# Patient Record
Sex: FEMALE
[De-identification: names, ages, dates, MRNs, and addresses within clinical notes are randomized per-mention and may not be internally consistent; named-entity substitution may affect disease eponyms.]

---

## 2018-05-05 ENCOUNTER — HOSPITAL ENCOUNTER (EMERGENCY)
Dept: HOSPITAL 92 - ERS | Age: 83
Discharge: HOME | End: 2018-05-05
Payer: MEDICARE

## 2018-05-05 DIAGNOSIS — Z79.899: ICD-10-CM

## 2018-05-05 DIAGNOSIS — F41.9: ICD-10-CM

## 2018-05-05 DIAGNOSIS — E03.9: ICD-10-CM

## 2018-05-05 DIAGNOSIS — N39.0: Primary | ICD-10-CM

## 2018-05-05 LAB
BACTERIA UR QL AUTO: (no result) HPF
CRYSTAL-AUWI FLAG: 0 (ref 0–15)
HEV IGM SER QL: 0.3 (ref 0–7.99)
HYALINE CASTS #/AREA URNS LPF: (no result) LPF
PATHC CAST-AUWI FLAG: 0.34 (ref 0–2.49)
PROT UR STRIP.AUTO-MCNC: 100 MG/DL
SP GR UR STRIP: 1.01 (ref 1–1.04)
SPERM-AUWI FLAG: 750 (ref 0–9.9)
YEAST-AUWI FLAG: 1282.4 (ref 0–25)

## 2018-05-05 PROCEDURE — 81015 MICROSCOPIC EXAM OF URINE: CPT

## 2018-05-05 PROCEDURE — 87077 CULTURE AEROBIC IDENTIFY: CPT

## 2018-05-05 PROCEDURE — 87086 URINE CULTURE/COLONY COUNT: CPT

## 2018-05-05 PROCEDURE — 99283 EMERGENCY DEPT VISIT LOW MDM: CPT

## 2018-05-05 PROCEDURE — 87186 SC STD MICRODIL/AGAR DIL: CPT

## 2018-05-05 PROCEDURE — 81003 URINALYSIS AUTO W/O SCOPE: CPT

## 2018-08-02 ENCOUNTER — HOSPITAL ENCOUNTER (OUTPATIENT)
Dept: HOSPITAL 92 - BICCT | Age: 83
Discharge: HOME | End: 2018-08-02
Attending: INTERNAL MEDICINE
Payer: MEDICARE

## 2018-08-02 DIAGNOSIS — K83.8: ICD-10-CM

## 2018-08-02 DIAGNOSIS — R91.8: Primary | ICD-10-CM

## 2018-08-02 DIAGNOSIS — R93.2: ICD-10-CM

## 2018-08-02 PROCEDURE — 71250 CT THORAX DX C-: CPT

## 2018-12-29 NOTE — RAD
FOUR VIEWS OF THE RIGHT ELBOW:

 

DATE: 12/29/2018.

 

HISTORY: 

Trauma.  Right arm pain after a fall on 12/28/2018.

 

FINDINGS: 

No obvious displaced fracture is visualized, but there is evidence of a joint effusion with elevation
 of both the posterior and anterior fat pads of the right elbow which suggests the possibility of a f
racture.  A few tiny calcific densities are seen adjacent to the wrist which may be related to degene
rative changes and prior injury.  Osteopenia is present.

 

IMPRESSION: 

1.  No displaced fracture is visualized, but there is evidence of a joint effusion which suggests a p
ossibility of a fracture involving the right elbow.  Followup views of the right elbow in 4-7 days is
 recommended after conservative management.

 

2.  Osteopenia and mild osteoarthritis right elbow.

 

POS: [facility]

## 2018-12-29 NOTE — RAD
THREE VIEWS RIGHT SHOULDER:

 

DATE:

12/29/2018.

 

HISTORY: 

Right arm pain post fall on 12/28/2018.  Trauma.

 

FINDINGS: 

There is mild right acromioclavicular joint osteoarthritis.  Irregularity of the acromion is present 
likely related to the degenerative changes.  No fracture or dislocation is appreciated.  Siler screw
s overlie the lateral aspect of the right humeral head likely related to prior rotator cuff repair.  
Degenerative changes are seen in the visualized thoracic spine.  Vascular calcification is seen in th
e thoracic aorta.

 

IMPRESSION: 

1.  No acute fracture or dislocation involving the right shoulder.

 

2.  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

 

3.  Postsurgical changes right shoulder.

 

POS: [facility]